# Patient Record
(demographics unavailable — no encounter records)

---

## 2024-10-25 NOTE — HISTORY OF PRESENT ILLNESS
[FreeTextEntry1] : Pt is a 34y  LMP 10/9 who presents with complaints of body pain and infertility. Pt states that for the past 2 years, she has been trying to conceive unsuccessfully. She tried using an ovulation kit to time intercourse. She also tried to use a gel and vitamins which claim to help with fertility. Pt states she would have unprotected sex with  several times a day for 3 days prior to and after ovulation. Pt is discouraged and losing hope of having another baby. Pt also states that she has a history of anxiety, depression, and body pain which she believes is related to her hormones. Upon further questioning, pt states that she has worsening body pain during time of ovulation, before and after menses. She says that body pain does not worsen during menses. Reinforced to patient that it seems like she is having pain throughout the month and unlikely related to menses. She states that she also gets occasional numbness on left side of her body. She has seen neurology and was prescribed gabapentin which temporarily relieved the pain. It is no longer effective. Pt states that she gets her period every month and they usually last 3 days. She states that her cycle used to be 28 days in length, but now her cycle is >30 days. She also states that she has noticed increase in acne on chin and nipple hair. She has not noticed weight gain.   Pap smear: 5+ years ago, reports wnl   OB Hx: VAVD 5 years ago  GYN hx: denies hx of abnormal paps, fibroids, cysts, STIs PMH: anxiety, depression which she sees psych for  PSH: appendectomy, rhinoplasty  Meds: Effexor Social hx: denies tobacco, alcohol, illicit drug use

## 2024-10-25 NOTE — DISCUSSION/SUMMARY
[FreeTextEntry1] : Pt is a 34y  LMP 10/9 who presents with complaints of body pain and infertility. Pt has been trying to conceive for 2 years. She also reports increase in body hair and acne. Benign physical exam.   #infertility/possible PCOS - script sent for AMH/estradiol/FSH/free and total testosterone/A1C, TFTs to be done on 3rd day of cycle  - f/u [facility] - script for hysterosalpingogram after completion of menses. Doxycycline 100mg BID for 5 days sent to preferred pharmacy. To start 2 days prior to hysterosalpingogram  - referral placed for [facility]   D/w Dr. Gulshan Meyer PGY1

## 2024-10-25 NOTE — DISCUSSION/SUMMARY
[FreeTextEntry1] : Pt is a 34y  LMP 10/9 who presents with complaints of body pain and infertility. Pt has been trying to conceive for 2 years. She also reports increase in body hair and acne. Benign physical exam.   #infertility/possible PCOS - script sent for AMH/estradiol/FSH/free and total testosterone/A1C, TFTs to be done on 3rd day of cycle  - f/u [facility] - script for hysterosalpingogram after completion of menses. Doxycycline 100mg BID for 5 days sent to preferred pharmacy. To start 2 days prior to hysterosalpingogram  - referral placed for [facility]   D/w Dr. Gulsahn Meyer PGY1

## 2024-11-01 NOTE — PHYSICAL EXAM
[General Appearance - Alert] : alert [General Appearance - In No Acute Distress] : in no acute distress [General Appearance - Well-Appearing] : healthy appearing [Oriented To Time, Place, And Person] : oriented to person, place, and time [Impaired Insight] : insight and judgment were intact [Affect] : the affect was normal [Mood] : the mood was normal [Memory Recent] : recent memory was not impaired [Memory Remote] : remote memory was not impaired [Cranial Nerves Facial (VII)] : face symmetrical [Cranial Nerves Accessory (XI - Cranial And Spinal)] : head turning and shoulder shrug symmetric [Cranial Nerves Hypoglossal (XII)] : there was no tongue deviation with protrusion [Motor Strength] : muscle strength was normal in all four extremities [Paresis Pronator Drift Right-Sided] : no pronator drift on the right [Paresis Pronator Drift Left-Sided] : no pronator drift on the left [Motor Strength Upper Extremities Bilaterally] : strength was normal in both upper extremities [Motor Strength Lower Extremities Bilaterally] : strength was normal in both lower extremities [Sensation Tactile Decrease] : light touch was intact [Romberg's Sign] : Romberg's sign was negtive [Abnormal Walk] : normal gait [Coordination - Dysmetria Impaired Finger-to-Nose Bilateral] : not present [2+] : Patella left 2+ [Sclera] : the sclera and conjunctiva were normal [PERRL With Normal Accommodation] : pupils were equal in size, round, reactive to light, with normal accommodation [Extraocular Movements] : extraocular movements were intact [] : no respiratory distress

## 2024-11-01 NOTE — ASSESSMENT
[FreeTextEntry1] : Chronic migraine  H/O depression - on Effexor 75mg / day  Discussed h/a hygiene